# Patient Record
Sex: MALE | Race: WHITE | NOT HISPANIC OR LATINO | Employment: STUDENT | URBAN - METROPOLITAN AREA
[De-identification: names, ages, dates, MRNs, and addresses within clinical notes are randomized per-mention and may not be internally consistent; named-entity substitution may affect disease eponyms.]

---
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
## 2024-08-19 ENCOUNTER — CLINICAL SUPPORT (OUTPATIENT)
Dept: URGENT CARE | Facility: CLINIC | Age: 19
End: 2024-08-19
Payer: COMMERCIAL

## 2024-08-19 DIAGNOSIS — Z01.89 ENCOUNTER FOR ROUTINE LABORATORY TESTING: Primary | ICD-10-CM

## 2024-08-19 PROCEDURE — 85660 RBC SICKLE CELL TEST: CPT | Performed by: NURSE PRACTITIONER

## 2024-08-19 NOTE — PROGRESS NOTES
Subjective:      Patient ID: Max Suazo is a 19 y.o. male.    Vitals:  vitals were not taken for this visit.     Chief Complaint: No chief complaint on file.    Pt came in for routine bloodwork  ROS   Objective:     Physical Exam    Assessment:     No diagnosis found.    Plan:       There are no diagnoses linked to this encounter.

## 2024-08-20 LAB — HGB S BLD QL SOLY: NEGATIVE

## 2024-09-30 ENCOUNTER — ATHLETIC TRAINING SESSION (OUTPATIENT)
Dept: SPORTS MEDICINE | Facility: CLINIC | Age: 19
End: 2024-09-30
Payer: COMMERCIAL

## 2024-09-30 DIAGNOSIS — Z00.00 HEALTHCARE MAINTENANCE: Primary | ICD-10-CM

## 2024-09-30 NOTE — PROGRESS NOTES
Reason for Encounter N/A    Subjective:       Chief Complaint: Max Suazo is a 19 y.o. male student at [employer] who had concerns including Health Maintenance of the Right Shoulder. Coming in for weekly arm care.    Handedness: right-handed  Sport played: baseball      Level: college      Position:pitcher            Objective:       General: Max is well-developed, well-nourished, appears stated age, in no acute distress, alert and oriented to time, place and person.     AT Session  - Weekly Arm Care Maintenance        Assessment:     Status: F - Full Participation    Date Seen:  09/23/2024-09/28/2024    Date of Injury:  N/A    Date Out:  N/A    Date Cleared:  N/A        Treatment/Rehab/Maintenance:            Rehab Log   Name: Max Suazo Sport:Baseball  Injury: Arm Care Maintenance    Date:09/23  Date:09/25  Date:09/26  Date:    Exercise/Modality Set/Rep Exercise/Modality Set/Rep Exercise/Modality Set/Rep Exercise/Modality Set/Rep   Palm Up Shoulder Flexion w/ 3lbs  2x10 Straight arm 3-way Clocks w/ Blue Band  2x10 (Both Arms) Banded IR 2x10     Palm Down Shoulder Flexion w/ 3lbs 2x10 Shoulder Flexion Wall Slides w/ Blue Band  2x10 Banded ER 2x10     Thumb Up Y's w/ 3lbs 2x10 Straight Arm Circles w/ Weighted Ball on Wall  2x10 (CW/CCW) Banded IR @90deg abduction 2x10     Thumb Down Y's w/ 3lbs 2x10 General Shoulder Stretching  Banded ER @90deg abduction 2x10     Shoulder Abduction w/ 3lbs 2x10   Banded Shoulder Flexion 2x10     Palm Up Reverse Fly w/ 3lbs 2x10   Banded Shoulder Extension 2x10     Palm Down Reverse Fly w/ 3lbs 2x10   Banded D2 Pattern Flexion 2x10     Stim 15min   Banded D2 Patter Extension 2x10         General Shoulder Stretching          Massage 5min     Notes: Notes:  Notes: Notes:            Plan:       1. Weekly Arm Care Maintenance  2. Physician Referral: no  3. ED Referral:no  4. Parent/Guardian Notified: No  5. All questions were answered, ath. will contact me for questions or  concerns in  the interim.  6.         Eligible to use School Insurance: Yes

## 2024-09-30 NOTE — PROGRESS NOTES
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 Coming in for weekly arm care.    Handedness: right-handed  Sport played: baseball      Level: college      Position:pitcher            Objective:       General: Max is well-developed, well-nourished, appears stated age, in no acute distress, alert and oriented to time, place and person.     AT Session  - Weekly Arm Care Maintenance        Assessment:     Status: F - Full Participation    Date Seen:  09/16/2024-09/21/2024    Date of Injury:  N/A    Date Out:  N/A    Date Cleared:  N/A        Treatment/Rehab/Maintenance:            Rehab Log   Name: Max Suazo Sport:Baseball  Injury: Arm Care Maintenance    Date:09/16  Date:09/19  Date:09/20  Date:    Exercise/Modality Set/Rep Exercise/Modality Set/Rep Exercise/Modality Set/Rep Exercise/Modality Set/Rep   Palm Up Shoulder Flexion w/ 3lbs  2x10 Straight arm 3-way Clocks w/ Blue Band  2x10 (Both Arms) Banded IR 2x10     Palm Down Shoulder Flexion w/ 3lbs 2x10 Shoulder Flexion Wall Slides w/ Blue Band  2x10 Banded ER 2x10     Thumb Up Y's w/ 3lbs 2x10 Straight Arm Circles w/ Weighted Ball on Wall  2x10 (CW/CCW) Banded IR @90deg abduction 2x10     Thumb Down Y's w/ 3lbs 2x10 General Shoulder Stretching  Banded ER @90deg abduction 2x10     Shoulder Abduction w/ 3lbs 2x10   Banded Shoulder Flexion 2x10     Palm Up Reverse Fly w/ 3lbs 2x10   Banded Shoulder Extension 2x10     Palm Down Reverse Fly w/ 3lbs 2x10   Banded D2 Pattern Flexion 2x10     Combo (Biceps) 5min   Banded D2 Patter Extension 2x10         General Shoulder Stretching          Massage 5min     Notes: Notes:  Notes: Notes:            Plan:       1. Weekly Arm Care Maintenance  2. Physician Referral: no  3. ED Referral:no  4. Parent/Guardian Notified: No  5. All questions were answered, ath. will contact me for questions  or concerns in  the interim.  6.         Eligible to use School Insurance: Yes

## 2024-11-05 ENCOUNTER — ATHLETIC TRAINING SESSION (OUTPATIENT)
Dept: SPORTS MEDICINE | Facility: CLINIC | Age: 19
End: 2024-11-05
Payer: COMMERCIAL

## 2024-11-05 DIAGNOSIS — M25.511 ACUTE PAIN OF RIGHT SHOULDER: Primary | ICD-10-CM

## 2024-11-05 NOTE — PROGRESS NOTES
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 His main complaint is pain and a pinching feeling on the outside of his shoulder with some movements. He pitched on Saturday and threw a little over 90 pitches in 3 innings. The most he has thrown in an outing this fall. He didn't recall any popping or weakness during his outing, but did start to have some tightness and soreness after his outing. He doesn't feel like his shoulder is lose or unstable.    Handedness: right-handed  Sport played: baseball      Level: college      Position:pitcher      Pain  This is a new problem. The current episode started yesterday. The problem has been gradually improving.       Review of Systems   Musculoskeletal:  Positive for muscle weakness and stiffness.                 Objective:       General: Max is well-developed, well-nourished, appears stated age, in no acute distress, alert and oriented to time, place and person.             Right Shoulder Exam     Inspection/Observation   Swelling: absent  Bruising: absent  Deformity: absent    Tenderness   The patient is tender to palpation of the supraspinatus (tender over the tendon).    Range of Motion   Active abduction:  normal Right shoulder active abduction: has a pinching feeling when getting close to 90.  Extension:  normal   Forward Flexion:  normal Right shoulder forward flexion: has pinching feeling when getting close to 90.  Adduction: normal    Tests & Signs   Apprehension: negative  Drop arm: negative  Sandy test: positive  Impingement: positive  Sulcus: absent  Anterior drawer test: negative.     Muscle Strength   Right Upper Extremity   Shoulder Abduction: 5/5   Shoulder Internal Rotation: 5/5   Shoulder External Rotation: 4/5 (had some pain causing weakness)   Biceps: 5/5   Triceps:  5/5            Assessment:     Status: AT - Cleared to  Exert    Date Seen:  11/04/2024    Date of Injury:  11/03/2024    Date Out:  N/A    Date Cleared:  N/A    Differential Diagnosis: Supraspinatus Tendonitis/ Sprain    Treatment/Rehab/Maintenance:           Plan:       1. Will work to calm down the inflammation of the Supraspinatus Tendon. Will also work some strengthening exercises within pain-free ROM  2. Physician Referral: no, will possibly have him see Dr. Ramirez on campus if there is no improvement over the next day or two  3. ED Referral:no  4. Parent/Guardian Notified: No  5. All questions were answered, ath. will contact me for questions or concerns in  the interim.  6.         Eligible to use School Insurance: Yes

## 2024-11-06 ENCOUNTER — TELEPHONE (OUTPATIENT)
Dept: SPORTS MEDICINE | Facility: CLINIC | Age: 19
End: 2024-11-06
Payer: COMMERCIAL

## 2024-11-06 DIAGNOSIS — S46.011A ROTATOR CUFF STRAIN, RIGHT, INITIAL ENCOUNTER: ICD-10-CM

## 2024-11-06 DIAGNOSIS — M25.511 ACUTE PAIN OF RIGHT SHOULDER: Primary | ICD-10-CM

## 2024-11-06 RX ORDER — METHYLPREDNISOLONE 4 MG/1
TABLET ORAL
Qty: 21 EACH | Refills: 0 | Status: SHIPPED | OUTPATIENT
Start: 2024-11-06 | End: 2024-11-27

## 2024-11-06 NOTE — TELEPHONE ENCOUNTER
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 He threw 90 pitches over 3 innings this weekend and admits to appreciating increased following. He denies any pain while throwing, but symptoms started after the outing. He is next scheduled to throw this upcoming Saturday. He has been taking ibuprofen 3 times daily with some improvement.       Physical Exam:  Shoulder: right  The affected shoulder is compared to the contralateral shoulder.    Observation:    CERVICAL SPINE  Normal head carriage. Normal thoracic kyphosis.  Full AROM in flexion, extension, sidebending, and rotation.    SHOULDER  No ecchymosis, edema, or erythema throughout the shoulder girdle.  No sternal, clavicular, or acromial deformities bilaterally.  No atrophy of the pectorals, deltoids, supraspinatus, infraspinatus, or biceps bilaterally.  No asymmetry of shoulders bilaterally.    ROM:  Active flexion to 180° bilaterally.   Active abduction to 180° bilaterally.    Active internal rotation to T7 bilaterally.    Active external rotation to T4 bilaterally.    No scapular dyskinesia or winging.    Tenderness:  No tenderness at the SC or AC joint  No tenderness over the clavicle   No tenderness over biceps tendon or bicipital groove  + tenderness over subacromial space  + tenderness over the lateral deltoid muscle    Strength Testing:  Deltoid - 5/5  Biceps - 5/5  Triceps - 5/5  Wrist extension - 5/5  Wrist flexion - 5/5   - 5/5  Finger extension - 5/5  Finger abduction - 5/5    Special Tests:  Spurlings test - negative  Lhermittes test - negative    Empty can test - positive pain  Full can test - negative  Bear hug test - negative  Belly press test - negative  Resisted internal rotation - negative  Resisted external rotation - positive pain    Neer's test -  negative  Hawkin's-Franck test - positive    OEdinsons test - negative  Biceps load 1 test - negative  Biceps load 2 test - negative  Fuentes sheer test - negative    Speed's test - negative  Yergason's test - negative    Sulcus sign - none  AP load and shift laxity - none  Anterior apprehension test - negative  Posterior apprehension test - negative    Neurovascular Exam:  2+ radial pulses BL  Sensation intact to light touch in the distal median, radial, and ulnar nerve distributions bilaterally.  Negative Tinel's at carpal tunnel  Negative Tinel's at cubital tunnel  Capillary refill intact <2 seconds in all digits bilaterally      Assessment:  1. Acute pain of right shoulder    2. Rotator cuff strain, right, initial encounter          Plan:     Max presents for right shoulder pain starting after a 90 pitch outing over 3 innings (more than double his normal amount thrown).     Medications - medrol dosepack prescribed todat    Exercises - continue working with his      Referrals - none at this time    Imaging - XR/diagnostic ultrasound if not improving, then MRI if indicated    ATR - Sport Participation: Participation with restrictions as tolerated without pain, and if his throwing mechanics are not altered by pain  . We will see how he does today and tomorrow with light activity and if getting better, continue to progress as tolerated..    Follow up - in clinic if not improving             This note was completed in the presence of the physician noted above    This note is dictated using the M*Modal Fluency Direct word recognition program. There are word recognition mistakes that are occasionally missed on review.

## 2024-11-30 ENCOUNTER — ATHLETIC TRAINING SESSION (OUTPATIENT)
Dept: SPORTS MEDICINE | Facility: CLINIC | Age: 19
End: 2024-11-30
Payer: COMMERCIAL

## 2024-11-30 DIAGNOSIS — M25.511 ACUTE PAIN OF RIGHT SHOULDER: Primary | ICD-10-CM

## 2024-11-30 NOTE — PROGRESS NOTES
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    Handedness: right-handed  Sport played: baseball      Level: college      Position:pitcher      Pain  The problem has been gradually improving.         Objective:       General: Max is well-developed, well-nourished, appears stated age, in no acute distress, alert and oriented to time, place and person.     AT Session          Assessment:     Status: AT - Cleared to Exert    Date Seen:  11/03/2024- 11/09/2024    Date of Injury:  11/03/2024    Date Out:  N/A    Date Cleared:  N/A        Treatment/Rehab/Maintenance:            Rehab Log   Name: Max Suazo Sport: Baseball   Injury: Right Shoulder Pain    Date:11/04  Date:11/05  Date:11/06  Date:11/07  Date:11/08    Exercise/Modality Set/Rep Exercise/Modality Set/Rep Exercise/Modality Set/Rep Exercise/Modality Set/Rep Exercise/Modality Set/Rep   Heat  7min Roll Out  Roll Out  Roll Out  Roll Out    Roll Out  3- Way Shoulder Stretch w/ pole 3x5 ea. 3-Way Shoulder Stretch w/ pole 3x5 ea 3- Way Shoulder Stretch w/ pole 3x5 ea 3-Way Shoulder Stretch w/ pole 3x5 ea.   3-Way Shoulder Stretch w/ pole 3x5 ea Resistance Band ER/IR  2x10 Body Blade 0deg abduction (bent arm)  3x 20sec Resistance Band ER/IR  2x12 Body Blade 0deg abduction (bent arm)  [id number]   Resistance Band ER/IR  2x10 Resistance Band ER/IR @ 90deg  2x10 Body Blade Forward Flex  3x 20sec Resistance Band ER/IR @ 90deg  2x12 Body Blade Forward Flex [id number]   Resistance Band ER/IR @ 90deg  2x10 Resistance Band Flex/Ext  2x10 Body Blade 90deg Abduction (straight arm)  3x 20sec Resistance Band Flex/Ext  2x12 Body Blade 90deg Abduction (straight arm)  3x20ec   Resistance Band ER/IR w/ Shoulder Flex  2x10 Resistance Band ER/IR w/ Shoulder Flex  2x10 Gameready and Stim 15min Resistance Band ER/IR w/ Shoulder Flex  2x12 Straight arm 3-way  Clocks w/ Blue Band  2x10   Resistance Band Flex/Ext  2x10 Combo 7min   Combo 7min Shoulder Flexion Wall Slides w/ Blue Band  2x10   Ice and Stim 15min                                   Notes: Had him stay in pain-free ROM for exercises. He was able to have decent ROM before feeling a pinch Notes: Kept him within pain-free ROM.  Overall his pain-free ROM increased. Notes:Dr. Ramirez prescribed him a Medrol Dose Pack which he started taking this morning. Steadily improving and feeling the pinch less and less.  Notes: Steadily improving. He was able to get full ROM with the exercises. He felt pretty good throwing. No pinching but didn't go past 90ft. Notes:Continuing to improve daily. 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 He felt fine.     Date:11/09    Exercise/Modality Set/Rep    Heat 7min   Roll Out    3-Way Shoulder Stretch w/ pole    Stim 15min                           Notes: He was able to pitch and went 4 innings  for about 65-70 pitches with no pain or pinching          Plan:       1. Will Continue to work on calming down the inflammation of his supraspinatus Tendon. Will work general shoulder strengthening within pain-free ROM.  2. Physician Referral: yes  3. ED Referral:no  4. Parent/Guardian Notified: No  5. All questions were answered, ath. will contact me for questions or concerns in  the interim.  6.         Eligible to use School Insurance: Yes

## 2025-01-31 ENCOUNTER — ATHLETIC TRAINING SESSION (OUTPATIENT)
Dept: SPORTS MEDICINE | Facility: CLINIC | Age: 20
End: 2025-01-31
Payer: COMMERCIAL

## 2025-01-31 DIAGNOSIS — M25.572 ACUTE LEFT ANKLE PAIN: Primary | ICD-10-CM

## 2025-01-31 NOTE — PROGRESS NOTES
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    Hector reports at the baseball field that he was running the bases and planted wrong, twisted his ankle and felt a "pop". He reports having sprained the right ankle before, but not the left. He can bear weight but it is painful.     Handedness: right-handed  Sport played: baseball      Level: college            Pain        ROS              Objective:       General: Max is well-developed, well-nourished, appears stated age, in no acute distress, alert and oriented to time, place and person.             Right Ankle/Foot Exam   Right ankle exam is normal.    Left Ankle/Foot Exam     Inspection  Effusion: Ankle - present     Swelling   The patient is swollen on the anterior talofibular ligament and lateral malleolus.    Tests   Anterior drawer: positive  Varus tilt: negative    Other   Sensation: normal              Assessment:     Status: F - Full Participation    Date Seen:  01/30/2025    Date of Injury:  01/30/2025    Date Out:  01/30/2025    Date Cleared:  n/a        Treatment/Rehab/Maintenance:     Max completed:    [x] INJURY TREATMENT    []MAINTENANCE  DATE OF SERVICE: 02/02/2025  INJURY/CONDITON: left ankle sprain    Max reports still having pain and swelling. Max received the selected modalities after being cleared for contradictions.  Max received education on potenital side effects of the selected modalities and agreed to treatment.      MODALITIES:    Other   []Foam Roller/Massage Gun []   []Recovery Boots []   [x]Game ready x20 mins          THERAPEUTIC EXERCISES:    Stretching  Cardio Rehab   []Stretching - Active  []Cardio - Bike [x]Rehab - Ankle/Foot   []Stretching - Dynamic  []Cardio - Elliptical []Rehab - Knee   []Stretching - Passive  []Cardio - Jog/Run []Rehab - Hip   []Stretching - PNF  []Cardio - Treadmill " []Rehab - Wrist/Hand   []Stretching - Static  []Cardio - Upper Body Ergometer []Rehab - Elbow     []Cardio - Walk []Rehab - Shoulder      []Rehab - Neck/Spine      []Rehab - Back      []Rehab - Other     Comment:     Exercise Reps/Sets/Time Weight/Band   Calf Strech [id number]    BAPS Board (Circles, Med/Lat, Sup/Post) X30 each    DL Calf Raises x20    4-way ankle [id number] green                                        Comment:    Max completed:    [x] INJURY TREATMENT    []MAINTENANCE  DATE OF SERVICE: 01/31/2025  INJURY/CONDITON: left ankle sprain    Max reports still having pain and swelling. Max received the selected modalities after being cleared for contradictions.  Max received education on potenital side effects of the selected modalities and agreed to treatment.      MODALITIES:    Massage Duration  (Mins) Add. Tx Parameters / Comment   []Massage - IASTM     [x]Massage - Therapeutic 5 Milking massage    []Myofascial/Active Release      []Cupping       Comment:    Other   []Foam Roller/Massage Gun []   []Recovery Boots []   [x]Game ready x20 mins          THERAPEUTIC EXERCISES:    Stretching  Cardio Rehab   []Stretching - Active  []Cardio - Bike [x]Rehab - Ankle/Foot   []Stretching - Dynamic  []Cardio - Elliptical []Rehab - Knee   []Stretching - Passive  []Cardio - Jog/Run []Rehab - Hip   []Stretching - PNF  []Cardio - Treadmill []Rehab - Wrist/Hand   []Stretching - Static  []Cardio - Upper Body Ergometer []Rehab - Elbow     []Cardio - Walk []Rehab - Shoulder      []Rehab - Neck/Spine      []Rehab - Back      []Rehab - Other     Comment:     Exercise Reps/Sets/Time Weight/Band   Ankle pumps x20    Circles X20 each    ABCs x1                                         Comment:      Max completed:    [x] INJURY TREATMENT    []MAINTENANCE  DATE OF SERVICE: 01/30/2025  INJURY/CONDITON: left ankle sprain    Max reports having ankle pain and swelling after twisting his ankle during practice today.  He was given a boot and compression wrap. Ice x20 mins. He was told to rest and ice again tonight. Max received the selected modalities after being cleared for contradictions.  Max received education on potenital side effects of the selected modalities and agreed to treatment.          Plan:       1. RICE, NSAIDs, therex  2. Physician Referral: no  3. ED Referral:no  4. Parent/Guardian Notified: No  5. All questions were answered, ath. will contact me for questions or concerns in  the interim.  6.         Eligible to use School Insurance: Yes

## 2025-02-10 ENCOUNTER — ATHLETIC TRAINING SESSION (OUTPATIENT)
Dept: SPORTS MEDICINE | Facility: CLINIC | Age: 20
End: 2025-02-10
Payer: COMMERCIAL

## 2025-02-10 DIAGNOSIS — M25.572 ACUTE LEFT ANKLE PAIN: Primary | ICD-10-CM

## 2025-02-10 NOTE — PROGRESS NOTES
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    Hector reports at the baseball field that he was running the bases and planted wrong, twisted his ankle and felt a "pop". He reports having sprained the right ankle before, but not the left. He can bear weight but it is painful.     Handedness: right-handed  Sport played: baseball      Level: college            Pain        ROS              Objective:       General: Max is well-developed, well-nourished, appears stated age, in no acute distress, alert and oriented to time, place and person.             Right Ankle/Foot Exam   Right ankle exam is normal.    Left Ankle/Foot Exam     Inspection  Effusion: Ankle - present     Swelling   The patient is swollen on the anterior talofibular ligament and lateral malleolus.    Tests   Anterior drawer: positive  Varus tilt: negative    Other   Sensation: normal              Assessment:     Status: F - Full Participation    Date Seen:  01/30/2025    Date of Injury:  01/30/2025    Date Out:  01/30/2025    Date Cleared:  n/a        Treatment/Rehab/Maintenance:     Max completed:    [x] INJURY TREATMENT    []MAINTENANCE  DATE OF SERVICE: 02/09/2025  INJURY/CONDITON: left ankle sprain    Max reports feeling good and just needing his ankle taped today to pitch. Max received the selected modalities after being cleared for contradictions.  Max received education on potenital side effects of the selected modalities and agreed to treatment.    Taping/Bracing Add. Tx Parameters / Comment   []Compression Wrap    []Support Wrap    []Taping - Preventative    [x]Taping - Injured Part Left ankle tape   []Wound Care    []Other:      Comment:     Max completed:    [x] INJURY TREATMENT    []MAINTENANCE  DATE OF SERVICE: 02/08/2025  INJURY/CONDITON: left ankle sprain    Max reports still having pain " and swelling. Max received the selected modalities after being cleared for contradictions.  Max received education on potenital side effects of the selected modalities and agreed to treatment.    THERAPEUTIC EXERCISES:    Stretching  Cardio Rehab   []Stretching - Active  []Cardio - Bike [x]Rehab - Ankle/Foot   []Stretching - Dynamic  []Cardio - Elliptical []Rehab - Knee   []Stretching - Passive  []Cardio - Jog/Run []Rehab - Hip   []Stretching - PNF  []Cardio - Treadmill []Rehab - Wrist/Hand   []Stretching - Static  []Cardio - Upper Body Ergometer []Rehab - Elbow     []Cardio - Walk []Rehab - Shoulder      []Rehab - Neck/Spine      []Rehab - Back      []Rehab - Other     Comment:     Exercise Reps/Sets/Time Weight/Band   Calf Strech [id number]    BAPS Board (Circles, Med/Lat, Sup/Post) X30 each    DL Calf Raises x20    4-way ankle [id number] green                                        Comment:    Taping/Bracing Add. Tx Parameters / Comment   []Compression Wrap    []Support Wrap    []Taping - Preventative    [x]Taping - Injured Part Left ankle tape   []Wound Care    []Other:      Comment:     Max completed:    [x] INJURY TREATMENT    []MAINTENANCE  DATE OF SERVICE: 02/04/2025  INJURY/CONDITON: left ankle sprain    Max reports still having pain and swelling. Max received the selected modalities after being cleared for contradictions.  Max received education on potenital side effects of the selected modalities and agreed to treatment.    THERAPEUTIC EXERCISES:    Stretching  Cardio Rehab   []Stretching - Active  []Cardio - Bike [x]Rehab - Ankle/Foot   []Stretching - Dynamic  []Cardio - Elliptical []Rehab - Knee   []Stretching - Passive  []Cardio - Jog/Run []Rehab - Hip   []Stretching - PNF  []Cardio - Treadmill []Rehab - Wrist/Hand   []Stretching - Static  []Cardio - Upper Body Ergometer []Rehab - Elbow     []Cardio - Walk []Rehab - Shoulder      []Rehab - Neck/Spine      []Rehab - Back      []Rehab  - Other     Comment:     Exercise Reps/Sets/Time Weight/Band   Calf Strech [id number]    BAPS Board (Circles, Med/Lat, Sup/Post) X30 each    DL Calf Raises x20    4-way ankle [id number] green                                        Comment:    Taping/Bracing Add. Tx Parameters / Comment   []Compression Wrap    []Support Wrap    []Taping - Preventative    [x]Taping - Injured Part Left ankle tape   []Wound Care    []Other:      Comment:           Plan:       1. RICE, NSAIDs, therex  2. Physician Referral: no  3. ED Referral:no  4. Parent/Guardian Notified: No  5. All questions were answered, ath. will contact me for questions or concerns in  the interim.  6.         Eligible to use School Insurance: Yes

## 2025-02-28 ENCOUNTER — ATHLETIC TRAINING SESSION (OUTPATIENT)
Dept: SPORTS MEDICINE | Facility: CLINIC | Age: 20
End: 2025-02-28
Payer: COMMERCIAL

## 2025-02-28 DIAGNOSIS — Z00.00 HEALTHCARE MAINTENANCE: Primary | ICD-10-CM

## 2025-02-28 NOTE — PROGRESS NOTES
Reason for Encounter N/A    Subjective:       Chief Complaint: Max Suazo is a 19 y.o. male student at [employer] who had concerns including Health Maintenance of the Right Shoulder. Coming in for weekly arm care.    Handedness: right-handed  Sport played: baseball      Level: college      Position:pitcher            Objective:       General: Max is well-developed, well-nourished, appears stated age, in no acute distress, alert and oriented to time, place and person.     AT Session  - Weekly Arm Care Maintenance        Assessment:     Status: F - Full Participation    Date Seen:  02/10/2024-02/12/2024    Date of Injury:  N/A    Date Out:  N/A    Date Cleared:  N/A        Treatment/Rehab/Maintenance:            Rehab Log   Name: Max Suazo Sport:Baseball  Injury: Arm Care Maintenance    Date:02/10  Date:02/11  Date:02/12  Date:    Exercise/Modality Set/Rep Exercise/Modality Set/Rep Exercise/Modality Set/Rep Exercise/Modality Set/Rep   Palm Up Shoulder Flexion w/ 3lbs  2x10 Straight arm 3-way Clocks w/ Blue Band  2x10 (Both Arms) Banded IR 2x10     Palm Down Shoulder Flexion w/ 3lbs 2x10 Shoulder Flexion Wall Slides w/ Blue Band  2x10 Banded ER 2x10     Thumb Up Y's w/ 3lbs 2x10 Straight Arm Circles w/ Weighted Ball on Wall  2x10 (CW/CCW) Banded IR @90deg abduction 2x10     Thumb Down Y's w/ 3lbs 2x10 General Shoulder Stretching  Banded ER @90deg abduction 2x10     Shoulder Abduction w/ 3lbs 2x10 Combo 5min Banded Shoulder Flexion 2x10     Palm Up Reverse Fly w/ 3lbs 2x10   Banded Shoulder Extension 2x10     Palm Down Reverse Fly w/ 3lbs 2x10   Banded D2 Pattern Flexion 2x10     Stim 15min   Banded D2 Patter Extension 2x10         General Shoulder Stretching          Massage 5min     Notes: Notes:  Notes: Notes:            Plan:       1. Weekly Arm Care Maintenance  2. Physician Referral: no  3. ED Referral:no  4. Parent/Guardian Notified: No  5. All questions were answered, ath. will contact me for questions  or concerns in  the interim.  6.         Eligible to use School Insurance: Yes

## 2025-02-28 NOTE — PROGRESS NOTES
Reason for Encounter N/A    Subjective:       Chief Complaint: Max Suazo is a 19 y.o. male student at [employer] who had concerns including Health Maintenance of the Right Shoulder. Coming in for weekly arm care.    Handedness: right-handed  Sport played: baseball      Level: college      Position:pitcher            Objective:       General: Max is well-developed, well-nourished, appears stated age, in no acute distress, alert and oriented to time, place and person.     AT Session  - Weekly Arm Care Maintenance        Assessment:     Status: F - Full Participation    Date Seen:  02/17/2024-02/22/2024    Date of Injury:  N/A    Date Out:  N/A    Date Cleared:  N/A        Treatment/Rehab/Maintenance:            Rehab Log   Name: Max Suazo Sport:Baseball  Injury: Arm Care Maintenance    Date:02/17  Date:02/18  Date:02/20  Date:    Exercise/Modality Set/Rep Exercise/Modality Set/Rep Exercise/Modality Set/Rep Exercise/Modality Set/Rep   Palm Up Shoulder Flexion w/ 3lbs  2x10 Straight arm 3-way Clocks w/ Blue Band  2x10 (Both Arms) Banded IR 2x10     Palm Down Shoulder Flexion w/ 3lbs 2x10 Shoulder Flexion Wall Slides w/ Blue Band  2x10 Banded ER 2x10     Thumb Up Y's w/ 3lbs 2x10 Straight Arm Circles w/ Weighted Ball on Wall  2x10 (CW/CCW) Banded IR @90deg abduction 2x10     Thumb Down Y's w/ 3lbs 2x10 General Shoulder Stretching  Banded ER @90deg abduction 2x10     Shoulder Abduction w/ 3lbs 2x10 Scrapping 5min Banded Shoulder Flexion 2x10     Palm Up Reverse Fly w/ 3lbs 2x10   Banded Shoulder Extension 2x10     Palm Down Reverse Fly w/ 3lbs 2x10   Banded D2 Pattern Flexion 2x10     Combo 5min   Banded D2 Patter Extension 2x10         General Shoulder Stretching          Combo 5min     Notes: Notes:  Notes: Notes:            Plan:       1. Weekly Arm Care Maintenance  2. Physician Referral: no  3. ED Referral:no  4. Parent/Guardian Notified: No  5. All questions were answered, ath. will contact me for  questions or concerns in  the interim.  6.         Eligible to use School Insurance: Yes

## 2025-03-31 ENCOUNTER — ATHLETIC TRAINING SESSION (OUTPATIENT)
Dept: SPORTS MEDICINE | Facility: CLINIC | Age: 20
End: 2025-03-31
Payer: COMMERCIAL

## 2025-03-31 DIAGNOSIS — Z00.00 HEALTHCARE MAINTENANCE: Primary | ICD-10-CM

## 2025-04-01 NOTE — PROGRESS NOTES
Reason for Encounter N/A    Subjective:       Chief Complaint: Max Suazo is a 19 y.o. male student at [employer] who had concerns including Health Maintenance of the Right Shoulder. Coming in for weekly arm care.    Handedness: right-handed  Sport played: baseball      Level: college      Position:pitcher            Objective:       General: Max is well-developed, well-nourished, appears stated age, in no acute distress, alert and oriented to time, place and person.     AT Session  - Weekly Arm Care Maintenance        Assessment:     Status: F - Full Participation    Date Seen:  03/23/2024-03/29/2024    Date of Injury:  N/A    Date Out:  N/A    Date Cleared:  N/A        Treatment/Rehab/Maintenance:            Rehab Log   Name: Max Suazo Sport:Baseball  Injury: Arm Care Maintenance    Date:3/24  Date:3/25  Date:3/26  Date:    Exercise/Modality Set/Rep Exercise/Modality Set/Rep Exercise/Modality Set/Rep Exercise/Modality Set/Rep   Palm Up Shoulder Flexion w/ 3lbs  2x10 Straight arm 3-way Clocks w/ Blue Band  2x10 (Both Arms) Banded IR 2x10     Palm Down Shoulder Flexion w/ 3lbs 2x10 Shoulder Flexion Wall Slides w/ Blue Band  2x10 Banded ER 2x10     Thumb Up Y's w/ 3lbs 2x10 Straight Arm Circles w/ Weighted Ball on Wall  2x10 (CW/CCW) Banded IR @90deg abduction 2x10     Thumb Down Y's w/ 3lbs 2x10 General Shoulder Stretching  Banded ER @90deg abduction 2x10     Shoulder Abduction w/ 3lbs 2x10 Scrapping 5min Banded Shoulder Flexion 2x10     Palm Up Reverse Fly w/ 3lbs 2x10   Banded Shoulder Extension 2x10     Palm Down Reverse Fly w/ 3lbs 2x10   Banded D2 Pattern Flexion 2x10     Combo 5min   Banded D2 Patter Extension 2x10         General Shoulder Stretching          Combo 5min     Notes: Notes:  Notes: Notes:            Plan:       1. Weekly Arm Care Maintenance  2. Physician Referral: no  3. ED Referral:no  4. Parent/Guardian Notified: No  5. All questions were answered, ath. will contact me for questions  or concerns in  the interim.  6.         Eligible to use School Insurance: Yes                      ROS

## 2025-04-01 NOTE — PROGRESS NOTES
Reason for Encounter N/A    Subjective:       Chief Complaint: Max Suazo is a 19 y.o. male student at [employer] who had concerns including Health Maintenance of the Right Shoulder. Coming in for weekly arm care.    Handedness: right-handed  Sport played: baseball      Level: college      Position:pitcher            Objective:       General: Max is well-developed, well-nourished, appears stated age, in no acute distress, alert and oriented to time, place and person.     AT Session  - Weekly Arm Care Maintenance        Assessment:     Status: F - Full Participation    Date Seen:  03/09/2024-03/15/2024    Date of Injury:  N/A    Date Out:  N/A    Date Cleared:  N/A        Treatment/Rehab/Maintenance:            Rehab Log   Name: Max Suazo Sport:Baseball  Injury: Arm Care Maintenance    Date:3/10  Date:3/11  Date:3/13  Date:    Exercise/Modality Set/Rep Exercise/Modality Set/Rep Exercise/Modality Set/Rep Exercise/Modality Set/Rep   Palm Up Shoulder Flexion w/ 3lbs  2x10 Straight arm 3-way Clocks w/ Blue Band  2x10 (Both Arms) Banded IR 2x10     Palm Down Shoulder Flexion w/ 3lbs 2x10 Shoulder Flexion Wall Slides w/ Blue Band  2x10 Banded ER 2x10     Thumb Up Y's w/ 3lbs 2x10 Straight Arm Circles w/ Weighted Ball on Wall  2x10 (CW/CCW) Banded IR @90deg abduction 2x10     Thumb Down Y's w/ 3lbs 2x10 General Shoulder Stretching  Banded ER @90deg abduction 2x10     Shoulder Abduction w/ 3lbs 2x10 Scrapping 5min Banded Shoulder Flexion 2x10     Palm Up Reverse Fly w/ 3lbs 2x10   Banded Shoulder Extension 2x10     Palm Down Reverse Fly w/ 3lbs 2x10   Banded D2 Pattern Flexion 2x10     Combo 5min   Banded D2 Patter Extension 2x10         General Shoulder Stretching          Combo 5min     Notes: Notes:  Notes: Notes:            Plan:       1. Weekly Arm Care Maintenance  2. Physician Referral: no  3. ED Referral:no  4. Parent/Guardian Notified: No  5. All questions were answered, ath. will contact me for questions  or concerns in  the interim.  6.         Eligible to use School Insurance: Yes                      ROS

## 2025-04-01 NOTE — PROGRESS NOTES
Reason for Encounter N/A    Subjective:       Chief Complaint: Max Suazo is a 19 y.o. male student at [employer] who had concerns including Health Maintenance of the Right Shoulder. Coming in for weekly arm care.    Handedness: right-handed  Sport played: baseball      Level: college      Position:pitcher            Objective:       General: Max is well-developed, well-nourished, appears stated age, in no acute distress, alert and oriented to time, place and person.     AT Session  - Weekly Arm Care Maintenance        Assessment:     Status: F - Full Participation    Date Seen:  03/02/2024-03/08/2024    Date of Injury:  N/A    Date Out:  N/A    Date Cleared:  N/A        Treatment/Rehab/Maintenance:            Rehab Log   Name: Max Suazo Sport:Baseball  Injury: Arm Care Maintenance    Date:3/4  Date:3/5  Date:3/6  Date:    Exercise/Modality Set/Rep Exercise/Modality Set/Rep Exercise/Modality Set/Rep Exercise/Modality Set/Rep   Palm Up Shoulder Flexion w/ 3lbs  2x10 Straight arm 3-way Clocks w/ Blue Band  2x10 (Both Arms) Banded IR 2x10     Palm Down Shoulder Flexion w/ 3lbs 2x10 Shoulder Flexion Wall Slides w/ Blue Band  2x10 Banded ER 2x10     Thumb Up Y's w/ 3lbs 2x10 Straight Arm Circles w/ Weighted Ball on Wall  2x10 (CW/CCW) Banded IR @90deg abduction 2x10     Thumb Down Y's w/ 3lbs 2x10 General Shoulder Stretching  Banded ER @90deg abduction 2x10     Shoulder Abduction w/ 3lbs 2x10 Scrapping 5min Banded Shoulder Flexion 2x10     Palm Up Reverse Fly w/ 3lbs 2x10   Banded Shoulder Extension 2x10     Palm Down Reverse Fly w/ 3lbs 2x10   Banded D2 Pattern Flexion 2x10     Combo 5min   Banded D2 Patter Extension 2x10         General Shoulder Stretching          Combo 5min     Notes: Notes:  Notes: Notes:            Plan:       1. Weekly Arm Care Maintenance  2. Physician Referral: no  3. ED Referral:no  4. Parent/Guardian Notified: No  5. All questions were answered, ath. will contact me for questions or  concerns in  the interim.  6.         Eligible to use School Insurance: Yes                      ROS

## 2025-04-01 NOTE — PROGRESS NOTES
Reason for Encounter N/A    Subjective:       Chief Complaint: Max Suazo is a 19 y.o. male student at [employer] who had concerns including Health Maintenance of the Right Shoulder. Coming in for weekly arm care.    Handedness: right-handed  Sport played: baseball      Level: college      Position:pitcher            Objective:       General: Max is well-developed, well-nourished, appears stated age, in no acute distress, alert and oriented to time, place and person.     AT Session  - Weekly Arm Care Maintenance        Assessment:     Status: F - Full Participation    Date Seen:  03/16/2024-03/22/2024    Date of Injury:  N/A    Date Out:  N/A    Date Cleared:  N/A        Treatment/Rehab/Maintenance:            Rehab Log   Name: Max Suazo Sport:Baseball  Injury: Arm Care Maintenance    Date:3/18  Date:3/19  Date:3/20  Date:    Exercise/Modality Set/Rep Exercise/Modality Set/Rep Exercise/Modality Set/Rep Exercise/Modality Set/Rep   Palm Up Shoulder Flexion w/ 3lbs  2x10 Straight arm 3-way Clocks w/ Blue Band  2x10 (Both Arms) Banded IR 2x10     Palm Down Shoulder Flexion w/ 3lbs 2x10 Shoulder Flexion Wall Slides w/ Blue Band  2x10 Banded ER 2x10     Thumb Up Y's w/ 3lbs 2x10 Straight Arm Circles w/ Weighted Ball on Wall  2x10 (CW/CCW) Banded IR @90deg abduction 2x10     Thumb Down Y's w/ 3lbs 2x10 General Shoulder Stretching  Banded ER @90deg abduction 2x10     Shoulder Abduction w/ 3lbs 2x10 Scrapping 5min Banded Shoulder Flexion 2x10     Palm Up Reverse Fly w/ 3lbs 2x10   Banded Shoulder Extension 2x10     Palm Down Reverse Fly w/ 3lbs 2x10   Banded D2 Pattern Flexion 2x10     Combo 5min   Banded D2 Patter Extension 2x10         General Shoulder Stretching          Combo 5min     Notes: Notes:  Notes: Notes:            Plan:       1. Weekly Arm Care Maintenance  2. Physician Referral: no  3. ED Referral:no  4. Parent/Guardian Notified: No  5. All questions were answered, ath. will contact me for questions  or concerns in  the interim.  6.         Eligible to use School Insurance: Yes                      ROS

## 2025-05-30 ENCOUNTER — ATHLETIC TRAINING SESSION (OUTPATIENT)
Dept: SPORTS MEDICINE | Facility: CLINIC | Age: 20
End: 2025-05-30
Payer: COMMERCIAL

## 2025-05-30 DIAGNOSIS — Z00.00 HEALTHCARE MAINTENANCE: Primary | ICD-10-CM

## 2025-05-30 NOTE — PROGRESS NOTES
Reason for Encounter N/A    Subjective:       Chief Complaint: Max Suazo is a 20 y.o. male student at [employer] who had concerns including Health Maintenance of the Right Shoulder. Coming in for weekly arm care.    Handedness: right-handed  Sport played: baseball      Level: college      Position:pitcher            Objective:       General: Max is well-developed, well-nourished, appears stated age, in no acute distress, alert and oriented to time, place and person.     AT Session  - Weekly Arm Care Maintenance        Assessment:     Status: F - Full Participation    Date Seen: 05/04/2024-05/10/2024    Date of Injury: N/A    Date Out: N/A    Date Cleared: N/A        Treatment/Rehab/Maintenance:            Rehab Log   Name: Max Suazo Sport:Baseball  Injury: Arm Care Maintenance    Date:5/5  Date:5/6  Date:5/7  Date:    Exercise/Modality Set/Rep Exercise/Modality Set/Rep Exercise/Modality Set/Rep Exercise/Modality Set/Rep   Palm Up Shoulder Flexion w/ 3lbs  2x10 Straight arm 3-way Clocks w/ Blue Band  2x10 (Both Arms) Banded IR 2x10     Palm Down Shoulder Flexion w/ 3lbs 2x10 Shoulder Flexion Wall Slides w/ Blue Band  2x10 Banded ER 2x10     Thumb Up Y's w/ 3lbs 2x10 Straight Arm Circles w/ Weighted Ball on Wall  2x10 (CW/CCW) Banded IR @90deg abduction 2x10     Thumb Down Y's w/ 3lbs 2x10 General Shoulder Stretching  Banded ER @90deg abduction 2x10     Shoulder Abduction w/ 3lbs 2x10 Scrapping 5min Banded Shoulder Flexion 2x10     Palm Up Reverse Fly w/ 3lbs 2x10   Banded Shoulder Extension 2x10     Palm Down Reverse Fly w/ 3lbs 2x10   Banded D2 Pattern Flexion 2x10     Combo 5min   Banded D2 Patter Extension 2x10         General Shoulder Stretching                Notes: Notes:  Notes: Notes:            Plan:       1. Weekly Arm Care Maintenance  2. Physician Referral: no  3. ED Referral:no  4. Parent/Guardian Notified: No  5. All questions were answered, ath. will contact me for questions or concerns in   the interim.  6.         Eligible to use School Insurance: Yes                      ROS

## 2025-05-30 NOTE — PROGRESS NOTES
Reason for Encounter N/A    Subjective:       Chief Complaint: Max Suazo is a 20 y.o. male student at [employer] who had concerns including Health Maintenance of the Right Shoulder. Coming in for weekly arm care.    Handedness: right-handed  Sport played: baseball      Level: college      Position:pitcher            Objective:       General: Max is well-developed, well-nourished, appears stated age, in no acute distress, alert and oriented to time, place and person.     AT Session  - Weekly Arm Care Maintenance        Assessment:     Status: F - Full Participation    Date Seen: 05/18/2024-05/24/2024    Date of Injury: N/A    Date Out: N/A    Date Cleared: N/A        Treatment/Rehab/Maintenance:            Rehab Log   Name: Max Suazo Sport:Baseball  Injury: Arm Care Maintenance    Date:5/19  Date:5/20  Date:5/21  Date:5/22    Exercise/Modality Set/Rep Exercise/Modality Set/Rep Exercise/Modality Set/Rep Exercise/Modality Set/Rep   Palm Up Shoulder Flexion w/ 3lbs  2x10 Straight arm 3-way Clocks w/ Blue Band  2x10 (Both Arms) Banded IR 2x10 Stim 20min   Palm Down Shoulder Flexion w/ 3lbs 2x10 Shoulder Flexion Wall Slides w/ Blue Band  2x10 Banded ER 2x10     Thumb Up Y's w/ 3lbs 2x10 Straight Arm Circles w/ Weighted Ball on Wall  2x10 (CW/CCW) Banded IR @90deg abduction 2x10     Thumb Down Y's w/ 3lbs 2x10 General Shoulder Stretching  Banded ER @90deg abduction 2x10     Shoulder Abduction w/ 3lbs 2x10 Scrapping 5min Banded Shoulder Flexion 2x10     Palm Up Reverse Fly w/ 3lbs 2x10   Banded Shoulder Extension 2x10     Palm Down Reverse Fly w/ 3lbs 2x10   Banded D2 Pattern Flexion 2x10     Combo 5min   Banded D2 Patter Extension 2x10         General Shoulder Stretching          Massage 5min     Notes: Notes:  Notes: Notes:            Plan:       1. Weekly Arm Care Maintenance  2. Physician Referral: no  3. ED Referral:no  4. Parent/Guardian Notified: No  5. All questions were answered, ath. will contact me for  questions or concerns in  the interim.  6.         Eligible to use School Insurance: Yes                      ROS

## 2025-05-30 NOTE — PROGRESS NOTES
Reason for Encounter N/A    Subjective:       Chief Complaint: Max Suazo is a 20 y.o. male student at [employer] who had concerns including Health Maintenance of the Right Shoulder. Coming in for weekly arm care.    Handedness: right-handed  Sport played: baseball      Level: college      Position:pitcher            Objective:       General: Max is well-developed, well-nourished, appears stated age, in no acute distress, alert and oriented to time, place and person.     AT Session  - Weekly Arm Care Maintenance        Assessment:     Status: F - Full Participation    Date Seen: 05/11/2024-05/17/2024    Date of Injury: N/A    Date Out: N/A    Date Cleared: N/A        Treatment/Rehab/Maintenance:            Rehab Log   Name: Max Suazo Sport:Baseball  Injury: Arm Care Maintenance    Date:5/12  Date:5/13  Date:5/14  Date:5/15    Exercise/Modality Set/Rep Exercise/Modality Set/Rep Exercise/Modality Set/Rep Exercise/Modality Set/Rep   Palm Up Shoulder Flexion w/ 3lbs  2x10 Straight arm 3-way Clocks w/ Blue Band  2x10 (Both Arms) Banded IR 2x10 Stim 20min   Palm Down Shoulder Flexion w/ 3lbs 2x10 Shoulder Flexion Wall Slides w/ Blue Band  2x10 Banded ER 2x10     Thumb Up Y's w/ 3lbs 2x10 Straight Arm Circles w/ Weighted Ball on Wall  2x10 (CW/CCW) Banded IR @90deg abduction 2x10     Thumb Down Y's w/ 3lbs 2x10 General Shoulder Stretching  Banded ER @90deg abduction 2x10     Shoulder Abduction w/ 3lbs 2x10 Scrapping 5min Banded Shoulder Flexion 2x10     Palm Up Reverse Fly w/ 3lbs 2x10   Banded Shoulder Extension 2x10     Palm Down Reverse Fly w/ 3lbs 2x10   Banded D2 Pattern Flexion 2x10     Combo 5min   Banded D2 Patter Extension 2x10         General Shoulder Stretching          Massage 5min     Notes: Notes:  Notes: Notes:            Plan:       1. Weekly Arm Care Maintenance  2. Physician Referral: no  3. ED Referral:no  4. Parent/Guardian Notified: No  5. All questions were answered, ath. will contact me for  questions or concerns in  the interim.  6.         Eligible to use School Insurance: Yes                      ROS

## 2025-08-31 ENCOUNTER — ATHLETIC TRAINING SESSION (OUTPATIENT)
Dept: SPORTS MEDICINE | Facility: CLINIC | Age: 20
End: 2025-08-31
Payer: COMMERCIAL

## 2025-08-31 DIAGNOSIS — Z00.00 HEALTHCARE MAINTENANCE: Primary | ICD-10-CM
